# Patient Record
Sex: FEMALE | Race: WHITE
[De-identification: names, ages, dates, MRNs, and addresses within clinical notes are randomized per-mention and may not be internally consistent; named-entity substitution may affect disease eponyms.]

---

## 2018-04-05 ENCOUNTER — HOSPITAL ENCOUNTER (OUTPATIENT)
Dept: HOSPITAL 53 - M RAD | Age: 58
End: 2018-04-05
Attending: NURSE PRACTITIONER
Payer: COMMERCIAL

## 2018-04-05 DIAGNOSIS — J44.9: Primary | ICD-10-CM

## 2018-04-05 DIAGNOSIS — K76.0: ICD-10-CM

## 2018-04-05 PROCEDURE — 71250 CT THORAX DX C-: CPT

## 2018-04-12 ENCOUNTER — HOSPITAL ENCOUNTER (OUTPATIENT)
Dept: HOSPITAL 53 - M LAB REF | Age: 58
End: 2018-04-12
Attending: INTERNAL MEDICINE
Payer: COMMERCIAL

## 2018-04-12 DIAGNOSIS — R91.8: Primary | ICD-10-CM

## 2018-04-12 LAB
ERYTHROCYTE SEDIMENTATION RATE: 6 MM/HR (ref 0–30)
RHEUMATOID FACTOR QUANT: < 10 IU/ML (ref ?–15)

## 2018-04-18 LAB
ANTI CENTROMERE ANTIBODY: <0.2 AI (ref 0–0.9)
ANTI SCLERODERMA ANTIBODIES: <0.2 AI (ref 0–0.9)
C-ANCA TITR SER IF: (no result) TITER
DSDNA AB SER-ACNC: 2 IU/ML (ref 0–9)
ENA RNP AB SER-ACNC: <0.2 AI (ref 0–0.9)
ENA SM AB SER-ACNC: <0.2 AI (ref 0–0.9)
ENA SS-B AB SER-ACNC: <0.2 AI (ref 0–0.9)
P-ANCA ATYPICAL TITR SER IF: (no result) TITER
P-ANCA TITR SER IF: (no result) TITER
SJOGREN'S ANTI SS-A: <0.2 AI (ref 0–0.9)

## 2018-06-05 ENCOUNTER — HOSPITAL ENCOUNTER (OUTPATIENT)
Dept: HOSPITAL 53 - M CARPUL | Age: 58
End: 2018-06-05
Attending: INTERNAL MEDICINE
Payer: COMMERCIAL

## 2018-06-05 DIAGNOSIS — J84.112: Primary | ICD-10-CM

## 2018-06-05 PROCEDURE — 94060 EVALUATION OF WHEEZING: CPT

## 2018-10-30 ENCOUNTER — HOSPITAL ENCOUNTER (OUTPATIENT)
Dept: HOSPITAL 53 - M RAD | Age: 58
End: 2018-10-30
Attending: INTERNAL MEDICINE
Payer: COMMERCIAL

## 2018-10-30 DIAGNOSIS — J84.10: ICD-10-CM

## 2018-10-30 DIAGNOSIS — J96.11: Primary | ICD-10-CM

## 2018-10-30 PROCEDURE — 71250 CT THORAX DX C-: CPT

## 2020-08-03 ENCOUNTER — HOSPITAL ENCOUNTER (INPATIENT)
Dept: HOSPITAL 53 - M ED | Age: 60
LOS: 2 days | Discharge: HOME | DRG: 253 | End: 2020-08-05
Attending: INTERNAL MEDICINE | Admitting: INTERNAL MEDICINE
Payer: COMMERCIAL

## 2020-08-03 DIAGNOSIS — J44.9: ICD-10-CM

## 2020-08-03 DIAGNOSIS — I10: ICD-10-CM

## 2020-08-03 DIAGNOSIS — K44.9: ICD-10-CM

## 2020-08-03 DIAGNOSIS — J96.11: ICD-10-CM

## 2020-08-03 DIAGNOSIS — I27.20: ICD-10-CM

## 2020-08-03 DIAGNOSIS — N83.201: ICD-10-CM

## 2020-08-03 DIAGNOSIS — E11.9: ICD-10-CM

## 2020-08-03 DIAGNOSIS — Z95.2: ICD-10-CM

## 2020-08-03 DIAGNOSIS — I25.10: ICD-10-CM

## 2020-08-03 DIAGNOSIS — D50.9: ICD-10-CM

## 2020-08-03 DIAGNOSIS — J84.10: ICD-10-CM

## 2020-08-03 DIAGNOSIS — K92.2: Primary | ICD-10-CM

## 2020-08-03 DIAGNOSIS — E87.2: ICD-10-CM

## 2020-08-03 PROCEDURE — 30233N1 TRANSFUSION OF NONAUTOLOGOUS RED BLOOD CELLS INTO PERIPHERAL VEIN, PERCUTANEOUS APPROACH: ICD-10-PCS | Performed by: INTERNAL MEDICINE

## 2020-09-15 ENCOUNTER — HOSPITAL ENCOUNTER (OUTPATIENT)
Dept: HOSPITAL 53 - M CARPUL | Age: 60
End: 2020-09-15
Attending: INTERNAL MEDICINE
Payer: COMMERCIAL

## 2020-09-15 DIAGNOSIS — J84.112: Primary | ICD-10-CM

## 2020-09-15 NOTE — PFTRPT
Height: 64.75  Inches  Weight: 152.00  Lbs  BSA: 1.76

Diagnosis: J84.112



DATE OF STUDY:  09/15/2020



ORDERING PHYSICIAN:   Dr. Solorzano



Pre and post bronchodilators have excellent technical quality. Forced vital 
capacity is normal. FEV1 is in proportion . Expiratory limb of the flow volume 
loop was reasonably normal. No significant bronchodilator response is 
identified. Total lung capacity is normal. Residual volumes are proportionate. 
Diffusion capacity is severely reduced and does not correct for alveolar volume.
 Hemoglobin acceptable at 13.4. Airway resistance and conductance are normal.  



IMPRESSION: Severe diffusion capacity impairment with only a minimal decline 
compared to her most recent prior study of February 2020. Please correlate 
clinically. 

MTDD

## 2020-09-24 LAB
APTT BLD: 23.7 SECONDS (ref 24.2–38.5)
BASOPHILS # BLD AUTO: 0.1 10^3/UL (ref 0–0.2)
BASOPHILS NFR BLD AUTO: 0.4 % (ref 0–1)
EOSINOPHIL # BLD AUTO: 0.2 10^3/UL (ref 0–0.5)
EOSINOPHIL NFR BLD AUTO: 1.4 % (ref 0–3)
HCT VFR BLD AUTO: 25.4 % (ref 36–47)
HGB BLD-MCNC: 7 G/DL (ref 12–15.5)
INR PPP: 1.06
LYMPHOCYTES # BLD AUTO: 1.1 10^3/UL (ref 1.5–5)
LYMPHOCYTES NFR BLD AUTO: 6.4 % (ref 24–44)
MCH RBC QN AUTO: 21 PG (ref 27–33)
MCHC RBC AUTO-ENTMCNC: 27.6 G/DL (ref 32–36.5)
MCV RBC AUTO: 76 FL (ref 80–96)
MONOCYTES # BLD AUTO: 0.6 10^3/UL (ref 0–0.8)
MONOCYTES NFR BLD AUTO: 3.7 % (ref 0–5)
NEUTROPHILS # BLD AUTO: 14.8 10^3/UL (ref 1.5–8.5)
NEUTROPHILS NFR BLD AUTO: 87.6 % (ref 36–66)
PLATELET # BLD AUTO: 441 10^3/UL (ref 150–450)
PROTHROMBIN TIME: 14 SECONDS (ref 12.5–14.3)
RBC # BLD AUTO: 3.34 10^6/UL (ref 4–5.4)
WBC # BLD AUTO: 16.9 10^3/UL (ref 4–10)

## 2020-10-03 LAB
HCT VFR BLD AUTO: 28.4 % (ref 36–47)
HGB BLD-MCNC: 8.2 G/DL (ref 12–15.5)
MCH RBC QN AUTO: 22.8 PG (ref 27–33)
MCHC RBC AUTO-ENTMCNC: 28.9 G/DL (ref 32–36.5)
MCV RBC AUTO: 78.9 FL (ref 80–96)
PLATELET # BLD AUTO: 355 10^3/UL (ref 150–450)
RBC # BLD AUTO: 3.6 10^6/UL (ref 4–5.4)
WBC # BLD AUTO: 7.4 10^3/UL (ref 4–10)

## 2020-10-11 LAB
BASOPHILS # BLD AUTO: 0.1 10^3/UL (ref 0–0.2)
BASOPHILS NFR BLD AUTO: 0.7 % (ref 0–1)
EOSINOPHIL # BLD AUTO: 0.5 10^3/UL (ref 0–0.5)
EOSINOPHIL NFR BLD AUTO: 5.3 % (ref 0–3)
HCT VFR BLD AUTO: 29.6 % (ref 36–47)
HCT VFR BLD AUTO: 30.3 % (ref 36–47)
HGB BLD-MCNC: 8.6 G/DL (ref 12–15.5)
HGB BLD-MCNC: 9 G/DL (ref 12–15.5)
LYMPHOCYTES # BLD AUTO: 2.7 10^3/UL (ref 1.5–5)
LYMPHOCYTES NFR BLD AUTO: 28.4 % (ref 24–44)
MCH RBC QN AUTO: 23.3 PG (ref 27–33)
MCHC RBC AUTO-ENTMCNC: 29.7 G/DL (ref 32–36.5)
MCV RBC AUTO: 78.5 FL (ref 80–96)
MONOCYTES # BLD AUTO: 0.8 10^3/UL (ref 0–0.8)
MONOCYTES NFR BLD AUTO: 8 % (ref 0–5)
NEUTROPHILS # BLD AUTO: 5.4 10^3/UL (ref 1.5–8.5)
NEUTROPHILS NFR BLD AUTO: 57.3 % (ref 36–66)
PLATELET # BLD AUTO: 381 10^3/UL (ref 150–450)
RBC # BLD AUTO: 3.86 10^6/UL (ref 4–5.4)
WBC # BLD AUTO: 9.5 10^3/UL (ref 4–10)

## 2020-10-12 LAB
ALBUMIN SERPL BCG-MCNC: 3.8 GM/DL (ref 3.2–5.2)
ALT SERPL W P-5'-P-CCNC: 22 U/L (ref 12–78)
BILIRUB SERPL-MCNC: 0.6 MG/DL (ref 0.2–1)
BUN SERPL-MCNC: 18 MG/DL (ref 7–18)
CALCIUM SERPL-MCNC: 8.9 MG/DL (ref 8.8–10.2)
CHLORIDE SERPL-SCNC: 109 MEQ/L (ref 98–107)
CK MB CFR.DF SERPL CALC: 2.22
CK MB SERPL-MCNC: < 1 NG/ML (ref ?–3.6)
CK SERPL-CCNC: 45 U/L (ref 26–192)
CO2 SERPL-SCNC: 24 MEQ/L (ref 21–32)
CREAT SERPL-MCNC: 0.73 MG/DL (ref 0.55–1.3)
FERRITIN SERPL-MCNC: 5 NG/ML (ref 8–252)
GFR SERPL CREATININE-BSD FRML MDRD: > 60 ML/MIN/{1.73_M2} (ref 45–?)
GLUCOSE SERPL-MCNC: 139 MG/DL (ref 70–100)
IRON SATN MFR SERPL: 2.3 % (ref 13.2–45)
IRON SERPL-MCNC: 11 UG/DL (ref 50–170)
POTASSIUM SERPL-SCNC: 4 MEQ/L (ref 3.5–5.1)
PROT SERPL-MCNC: 7.3 GM/DL (ref 6.4–8.2)
SODIUM SERPL-SCNC: 141 MEQ/L (ref 136–145)
TIBC SERPL-MCNC: 477 UG/DL (ref 250–450)
TROPONIN I SERPL-MCNC: < 0.02 NG/ML (ref ?–0.1)

## 2020-10-18 LAB
BUN SERPL-MCNC: 13 MG/DL (ref 7–18)
CALCIUM SERPL-MCNC: 9 MG/DL (ref 8.8–10.2)
CHLORIDE SERPL-SCNC: 106 MEQ/L (ref 98–107)
CO2 SERPL-SCNC: 30 MEQ/L (ref 21–32)
CREAT SERPL-MCNC: 0.64 MG/DL (ref 0.55–1.3)
GFR SERPL CREATININE-BSD FRML MDRD: > 60 ML/MIN/{1.73_M2} (ref 45–?)
GLUCOSE SERPL-MCNC: 93 MG/DL (ref 70–100)
POTASSIUM SERPL-SCNC: 4 MEQ/L (ref 3.5–5.1)
SODIUM SERPL-SCNC: 139 MEQ/L (ref 136–145)

## 2020-10-20 LAB
BASE EXCESS BLDV CALC-SCNC: (no result) MMOL/L (ref -2–2)
BDY SITE: (no result)
BODY TEMPERATURE: (no result)
CO2 BLDV CALC-SCNC: (no result) MEQ/L (ref 24–28)
FIO2 ON VENT: (no result) %
FIRST RESPIRATION RATE SET: (no result) /MIN
HCO3 BLDV-SCNC: (no result) MEQ/L (ref 23–27)
HCO3 STD BLDV-SCNC: (no result) MEQ/L
INHALED O2 FLOW RATE: (no result) L/MIN
PCO2 BLDC TCCO2: (no result) MM[HG]
PCO2 BLDC TCCO2: (no result) MM[HG]
PCO2 BLDV: (no result) MMHG (ref 38–50)
PEEP SETTING VENT: (no result) CM[H2O]
PH BLDV: (no result) UNITS (ref 7.33–7.43)
PO2 BLDV: (no result) MMHG (ref 30–50)
SAO2 % BLDV: (no result) % (ref 60–80)
SAO2 RESTING % BLDA PULSEOX: (no result) %
VENTILATION MODE VENT: (no result)
VT RATE SETTING VENT: (no result) CC

## 2020-10-27 LAB
CK MB CFR.DF SERPL CALC: 2.44
CK MB SERPL-MCNC: < 1 NG/ML (ref ?–3.6)
CK SERPL-CCNC: 41 U/L (ref 26–192)
TROPONIN I SERPL-MCNC: < 0.02 NG/ML (ref ?–0.1)

## 2020-10-28 LAB
BUN SERPL-MCNC: 9 MG/DL (ref 7–18)
CALCIUM SERPL-MCNC: 8.6 MG/DL (ref 8.8–10.2)
CHLORIDE SERPL-SCNC: 108 MEQ/L (ref 98–107)
CO2 SERPL-SCNC: 28 MMOL/L (ref 20–29)
CREAT SERPL-MCNC: 0.57 MG/DL (ref 0.55–1.3)
GFR SERPL CREATININE-BSD FRML MDRD: > 60 ML/MIN/{1.73_M2} (ref 45–?)
GLUCOSE SERPL-MCNC: 99 MG/DL (ref 70–100)
POTASSIUM SERPL-SCNC: 4 MEQ/L (ref 3.5–5.1)
SODIUM SERPL-SCNC: 140 MEQ/L (ref 136–145)

## 2024-12-26 NOTE — ECGEPIP
SINUS RHYTHM

NONSPECIFIC ST & t-WAVE ABNORMALITY

BORDERLINE ECG

NO OLD

SEE SCANNED DOWNTIME REPORT

MTDD
Yes...